# Patient Record
Sex: FEMALE | Race: WHITE | NOT HISPANIC OR LATINO | URBAN - METROPOLITAN AREA
[De-identification: names, ages, dates, MRNs, and addresses within clinical notes are randomized per-mention and may not be internally consistent; named-entity substitution may affect disease eponyms.]

---

## 2019-11-27 ENCOUNTER — EMERGENCY (EMERGENCY)
Facility: HOSPITAL | Age: 79
LOS: 0 days | Discharge: HOME | End: 2019-11-27
Attending: HOSPITALIST
Payer: MEDICARE

## 2019-11-27 VITALS — WEIGHT: 158.95 LBS | HEIGHT: 63 IN

## 2019-11-27 VITALS
SYSTOLIC BLOOD PRESSURE: 140 MMHG | HEART RATE: 65 BPM | OXYGEN SATURATION: 97 % | DIASTOLIC BLOOD PRESSURE: 63 MMHG | RESPIRATION RATE: 16 BRPM | TEMPERATURE: 98 F

## 2019-11-27 DIAGNOSIS — E78.5 HYPERLIPIDEMIA, UNSPECIFIED: ICD-10-CM

## 2019-11-27 DIAGNOSIS — R21 RASH AND OTHER NONSPECIFIC SKIN ERUPTION: ICD-10-CM

## 2019-11-27 DIAGNOSIS — M25.512 PAIN IN LEFT SHOULDER: ICD-10-CM

## 2019-11-27 DIAGNOSIS — M33.20 POLYMYOSITIS, ORGAN INVOLVEMENT UNSPECIFIED: ICD-10-CM

## 2019-11-27 DIAGNOSIS — M79.89 OTHER SPECIFIED SOFT TISSUE DISORDERS: ICD-10-CM

## 2019-11-27 DIAGNOSIS — I10 ESSENTIAL (PRIMARY) HYPERTENSION: ICD-10-CM

## 2019-11-27 DIAGNOSIS — M25.531 PAIN IN RIGHT WRIST: ICD-10-CM

## 2019-11-27 LAB
ALBUMIN SERPL ELPH-MCNC: 4.1 G/DL — SIGNIFICANT CHANGE UP (ref 3.3–5)
ALP SERPL-CCNC: 63 U/L — SIGNIFICANT CHANGE UP (ref 40–120)
ALT FLD-CCNC: 24 U/L — SIGNIFICANT CHANGE UP (ref 12–78)
ANION GAP SERPL CALC-SCNC: 7 MMOL/L — SIGNIFICANT CHANGE UP (ref 5–17)
AST SERPL-CCNC: 25 U/L — SIGNIFICANT CHANGE UP (ref 15–37)
BASOPHILS # BLD AUTO: 0.04 K/UL — SIGNIFICANT CHANGE UP (ref 0–0.2)
BASOPHILS NFR BLD AUTO: 0.3 % — SIGNIFICANT CHANGE UP (ref 0–2)
BILIRUB SERPL-MCNC: 0.6 MG/DL — SIGNIFICANT CHANGE UP (ref 0.2–1.2)
BUN SERPL-MCNC: 18 MG/DL — SIGNIFICANT CHANGE UP (ref 7–23)
CALCIUM SERPL-MCNC: 9.7 MG/DL — SIGNIFICANT CHANGE UP (ref 8.5–10.1)
CHLORIDE SERPL-SCNC: 102 MMOL/L — SIGNIFICANT CHANGE UP (ref 96–108)
CK SERPL-CCNC: 50 U/L — SIGNIFICANT CHANGE UP (ref 26–192)
CO2 SERPL-SCNC: 27 MMOL/L — SIGNIFICANT CHANGE UP (ref 22–31)
CREAT SERPL-MCNC: 0.92 MG/DL — SIGNIFICANT CHANGE UP (ref 0.5–1.3)
EOSINOPHIL # BLD AUTO: 0.45 K/UL — SIGNIFICANT CHANGE UP (ref 0–0.5)
EOSINOPHIL NFR BLD AUTO: 3.5 % — SIGNIFICANT CHANGE UP (ref 0–6)
ERYTHROCYTE [SEDIMENTATION RATE] IN BLOOD: 43 MM/HR — HIGH (ref 0–20)
GLUCOSE SERPL-MCNC: 116 MG/DL — HIGH (ref 70–99)
HCT VFR BLD CALC: 41.6 % — SIGNIFICANT CHANGE UP (ref 34.5–45)
HGB BLD-MCNC: 14.1 G/DL — SIGNIFICANT CHANGE UP (ref 11.5–15.5)
IMM GRANULOCYTES NFR BLD AUTO: 0.3 % — SIGNIFICANT CHANGE UP (ref 0–1.5)
LYMPHOCYTES # BLD AUTO: 1.63 K/UL — SIGNIFICANT CHANGE UP (ref 1–3.3)
LYMPHOCYTES # BLD AUTO: 12.5 % — LOW (ref 13–44)
MCHC RBC-ENTMCNC: 31.5 PG — SIGNIFICANT CHANGE UP (ref 27–34)
MCHC RBC-ENTMCNC: 33.9 GM/DL — SIGNIFICANT CHANGE UP (ref 32–36)
MCV RBC AUTO: 93.1 FL — SIGNIFICANT CHANGE UP (ref 80–100)
MONOCYTES # BLD AUTO: 0.67 K/UL — SIGNIFICANT CHANGE UP (ref 0–0.9)
MONOCYTES NFR BLD AUTO: 5.2 % — SIGNIFICANT CHANGE UP (ref 2–14)
NEUTROPHILS # BLD AUTO: 10.16 K/UL — HIGH (ref 1.8–7.4)
NEUTROPHILS NFR BLD AUTO: 78.2 % — HIGH (ref 43–77)
PLATELET # BLD AUTO: 237 K/UL — SIGNIFICANT CHANGE UP (ref 150–400)
POTASSIUM SERPL-MCNC: 3.8 MMOL/L — SIGNIFICANT CHANGE UP (ref 3.5–5.3)
POTASSIUM SERPL-SCNC: 3.8 MMOL/L — SIGNIFICANT CHANGE UP (ref 3.5–5.3)
PROT SERPL-MCNC: 7.6 GM/DL — SIGNIFICANT CHANGE UP (ref 6–8.3)
RBC # BLD: 4.47 M/UL — SIGNIFICANT CHANGE UP (ref 3.8–5.2)
RBC # FLD: 12.6 % — SIGNIFICANT CHANGE UP (ref 10.3–14.5)
SODIUM SERPL-SCNC: 136 MMOL/L — SIGNIFICANT CHANGE UP (ref 135–145)
WBC # BLD: 12.99 K/UL — HIGH (ref 3.8–10.5)
WBC # FLD AUTO: 12.99 K/UL — HIGH (ref 3.8–10.5)

## 2019-11-27 PROCEDURE — 36415 COLL VENOUS BLD VENIPUNCTURE: CPT

## 2019-11-27 PROCEDURE — 99284 EMERGENCY DEPT VISIT MOD MDM: CPT | Mod: 25

## 2019-11-27 PROCEDURE — 73030 X-RAY EXAM OF SHOULDER: CPT | Mod: 50

## 2019-11-27 PROCEDURE — 73100 X-RAY EXAM OF WRIST: CPT | Mod: 26,50

## 2019-11-27 PROCEDURE — 73030 X-RAY EXAM OF SHOULDER: CPT | Mod: 26,50

## 2019-11-27 PROCEDURE — 80053 COMPREHEN METABOLIC PANEL: CPT

## 2019-11-27 PROCEDURE — 85652 RBC SED RATE AUTOMATED: CPT

## 2019-11-27 PROCEDURE — 86140 C-REACTIVE PROTEIN: CPT

## 2019-11-27 PROCEDURE — 82550 ASSAY OF CK (CPK): CPT

## 2019-11-27 PROCEDURE — 99284 EMERGENCY DEPT VISIT MOD MDM: CPT

## 2019-11-27 PROCEDURE — 73100 X-RAY EXAM OF WRIST: CPT | Mod: 50

## 2019-11-27 PROCEDURE — 85025 COMPLETE CBC W/AUTO DIFF WBC: CPT

## 2019-11-27 RX ORDER — OXYCODONE AND ACETAMINOPHEN 5; 325 MG/1; MG/1
1 TABLET ORAL ONCE
Refills: 0 | Status: DISCONTINUED | OUTPATIENT
Start: 2019-11-27 | End: 2019-11-27

## 2019-11-27 RX ADMIN — Medication 50 MILLIGRAM(S): at 18:56

## 2019-11-27 RX ADMIN — OXYCODONE AND ACETAMINOPHEN 1 TABLET(S): 5; 325 TABLET ORAL at 18:40

## 2019-11-27 NOTE — ED STATDOCS - SKIN, MLM
maculopapular rash involving b/.l UE, chest, back, upper legs, ankles. no involvement of palms. blanching. nonulcerated. no cellulitic appearance.

## 2019-11-27 NOTE — ED ADULT NURSE NOTE - CHIEF COMPLAINT QUOTE
pt sent by MD for rash x1wk that has gotten worse despite steroids. reports b/l hand swelling and pain. states the rash is all over her body. Has been taking Aleve w/o relief. last dose this morning.

## 2019-11-27 NOTE — ED STATDOCS - PATIENT PORTAL LINK FT
You can access the FollowMyHealth Patient Portal offered by [facility] by registering at the following website: [email or web address]. By joining [employer]’s FollowMyHealth portal, you will also be able to view your health information using other applications (apps) compatible with our system.

## 2019-11-27 NOTE — ED STATDOCS - OBJECTIVE STATEMENT
77 y/o male with PMHx of HLD, HTN, carpel tunnel presents to the ED c/o sharp, burning pain and swelling to b/l hands. +joint pain to wrists, shoulders. Pt also with generalized rash first noticed to arms and later radiating in. Pt initially with itch, which has resolved. Denies fever. Taking PO steroid with no relief. Took Zyrtec with relief of itch. Taking Aleve with no relief. No recent travel. No new cosmetics or exposures. PCP/Dr. Araiza but is being taken over by Dr. Bennett.

## 2019-11-27 NOTE — ED STATDOCS - NSFOLLOWUPINSTRUCTIONS_ED_ALL_ED_FT
Polymyositis  Polymyositis is a disease that causes inflammation and weakness of the muscles. It can also affect skin tissues. Polymyositis is also known as idiopathic inflammatory myopathy. It is often associated with diseases in which the body mistakenly attacks its own cells and tissues (autoimmune diseases).  What are the causes?  The cause of polymyositis is not known.  What increases the risk?  The following factors may make you more likely to develop this condition:  Gender. Polymyositis is more common in women.Age. The disease is more common in adults.What are the signs or symptoms?  Symptoms of this condition include:  Weakness in your neck, shoulder, upper arm, hip, or thigh.Having difficulty:  Rising from a seated position.Climbing stairs.Lifting objects.Reaching overhead.Swallowing (dysphagia).Sore muscles.Fatigue.Fever.Hard bumps under your skin.Unexplained weight loss.How is this diagnosed?  This condition may be diagnosed based on medical history and a physical exam. Various tests may also be done, including:  Blood tests.MRI.EMG (electromyography). This is a test to check the electrical activity of your muscles.Biopsy. This is a test in which a sample of muscle tissue is taken and checked under a microscope.How is this treated?  There is no cure for polymyositis, but treatment can improve muscle strength and function. The earlier the treatment is started, the more effective it is. Treatment may include:  Corticosteroid medicines to help control inflammation.Immunoglobulin medicines to introduce healthy antibodies from blood donors.Immunosuppressive medicines to control the activity of the immune system.Physical therapy to strengthen muscles.Speech and language therapy to improve your ability to talk and eat.Follow these instructions at home:     Safety        Stay active. An exercise routine can help you build and maintain muscle strength. Talk with your health care provider or your physical therapist before you start any exercise program.If necessary, take steps to prevent falls by:  Installing grab bars for your tub, shower, and toilet.Installing a pole that goes from floor to ceiling. This helps you when going from a seated to a standing position.Protect your skin from the sun. Wear sunscreen or protective clothing when you are outside.General instructions     Work closely with your health care team, including physical and speech therapists, if needed.Take over-the-counter and prescription medicines only as told by your health care provider.Avoid alcohol.Rest when you are tired.Do not use any products that contain nicotine or tobacco, such as cigarettes and e-cigarettes. If you need help quitting, ask your health care provider.Keep all follow-up visits as told by your health care provider. This is important.Contact a health care provider if you:  Develop new or worsening muscle weakness.Get help right away if you have:  Trouble swallowing or speaking.Shortness of breath.Summary  Polymyositis is a disease that causes inflammation and weakness of your muscles. It can also affect your skin tissues.It is often associated with diseases in which the body attacks its own cells and tissues (autoimmune diseases).The cause of polymyositis is not known.There is no cure for polymyositis, but treatment can improve muscle strength and function. The earlier treatment is started, the more effective it is.This information is not intended to replace advice given to you by your health care provider. Make sure you discuss any questions you have with your health care provider.

## 2019-11-27 NOTE — ED ADULT TRIAGE NOTE - CHIEF COMPLAINT QUOTE
pt sent by MD for rash x1wk that has gotten worse despite steroids. reports b/l hand swelling and pain. states the rash is all over her body pt sent by MD for rash x1wk that has gotten worse despite steroids. reports b/l hand swelling and pain. states the rash is all over her body. Has been taking Aleve w/o relief. last dose this morning.

## 2019-11-27 NOTE — ED STATDOCS - CARE PROVIDER_API CALL
Palmer Cisse (DO)  Internal Medicine; Rheumatology  [address] [place] [address]  Phone: [phone]  Fax: [phone]  Follow Up Time:

## 2019-11-27 NOTE — ED STATDOCS - PROGRESS NOTE DETAILS
77 y/o F with PMH of HLD, HTN, carpal tunnel, sarcoidsis presents with bilateral UE pain and diffuse rash. Pt states rash is present over bilateral upper extremities, torso and lower extremities. states rash was initially itchy, which improved with zyrtec. No change in pain with aleve at home. Was seen by PCP last week, provided medrol dosepak. Reevaluated today and was advised to go to ED. Reports difficulty with ROM in bilateral shoulders as well. Denies tick bite, spider bite, fever, chills, recent outdoor activity, recent antibiotic use, new detergents, new soaps, new lotions, numbness/tingling in extremities. PE: Well appearing. Cardiac: s1s2, RRR. Lungs: CTAB. Abdomen: NBS x4, soft, nontender. Skin: diffuse maculopapular rash to all extremities and torso. No involvement past wrist/ankles. +blanching. MSK: pt has ring on 4th digit left upper extremity. +edema left upper extremities distal to wrist. Full ROM in bilateral hands, fingers, wrists. limited bilateral shoulder flexion. Neuro: Sensation intact to light touch in all extremities. A/p: diffuse maculopapular rash. 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 Plan for labs, analgesia, prednisone, likely d/c with rheumatology & dermatology follow up. - Raudel Lucas PA-C Patient recommended to have ring on 4th digit left hand removed. States she is unable to remove her ring on her own. Refused to have ring cut. Patient made aware of possibility of cutting off neurovascular supply to finger which may result in death of tissue & loss of finger. patient states she would prefer to attempt to remove ring at home and not have ring cut in ED. - Raudel Lucas PA-C Will manage with prednisone, advised patient to pursue rheumatology follow up. Plan for d/c. - KHALIDA ArcherC

## 2019-11-27 NOTE — ED STATDOCS - NS_ ATTENDINGSCRIBEDETAILS _ED_A_ED_FT
Jing Figueroa MD: The history, relevant review of systems, past medical and surgical history, medical decision making, and physical examination was documented by the scribe in my presence and I attest to the accuracy of the documentation.

## 2019-11-28 LAB — CRP SERPL-MCNC: 7.75 MG/DL — HIGH (ref 0–0.4)

## 2019-11-28 NOTE — ED POST DISCHARGE NOTE - RESULT SUMMARY
Elevated CRP, spoke to pt and advised of level, recommend pt see rheum in follow up as advised in ED. Pt agrees with plan of  care. signed Valerie Silva PA-C

## 2025-03-26 ENCOUNTER — RX ONLY (RX ONLY)
Age: 85
End: 2025-03-26

## 2025-03-26 ENCOUNTER — OFFICE (OUTPATIENT)
Dept: URBAN - METROPOLITAN AREA CLINIC 102 | Facility: CLINIC | Age: 85
Setting detail: OPHTHALMOLOGY
End: 2025-03-26
Payer: COMMERCIAL

## 2025-03-26 DIAGNOSIS — H02.831: ICD-10-CM

## 2025-03-26 DIAGNOSIS — H16.223: ICD-10-CM

## 2025-03-26 DIAGNOSIS — H02.834: ICD-10-CM

## 2025-03-26 DIAGNOSIS — H40.023: ICD-10-CM

## 2025-03-26 PROBLEM — H01.002 BLEPHARITIS; RIGHT UPPER LID, RIGHT LOWER LID, LEFT UPPER LID, LEFT LOWER LID: Status: ACTIVE | Noted: 2025-03-26

## 2025-03-26 PROBLEM — H01.004 BLEPHARITIS; RIGHT UPPER LID, RIGHT LOWER LID, LEFT UPPER LID, LEFT LOWER LID: Status: ACTIVE | Noted: 2025-03-26

## 2025-03-26 PROBLEM — H25.13 CATARACT NUCLEAR SCLEROSIS AGE RELATED; BOTH EYES: Status: ACTIVE | Noted: 2025-03-26

## 2025-03-26 PROBLEM — H01.001 BLEPHARITIS; RIGHT UPPER LID, RIGHT LOWER LID, LEFT UPPER LID, LEFT LOWER LID: Status: ACTIVE | Noted: 2025-03-26

## 2025-03-26 PROBLEM — H52.7 REFRACTIVE ERROR: Status: ACTIVE | Noted: 2025-03-26

## 2025-03-26 PROBLEM — H01.005 BLEPHARITIS; RIGHT UPPER LID, RIGHT LOWER LID, LEFT UPPER LID, LEFT LOWER LID: Status: ACTIVE | Noted: 2025-03-26

## 2025-03-26 PROCEDURE — 92020 GONIOSCOPY: CPT | Performed by: OPHTHALMOLOGY

## 2025-03-26 PROCEDURE — 92083 EXTENDED VISUAL FIELD XM: CPT | Performed by: OPHTHALMOLOGY

## 2025-03-26 PROCEDURE — 92004 COMPRE OPH EXAM NEW PT 1/>: CPT | Performed by: OPHTHALMOLOGY

## 2025-03-26 PROCEDURE — 92133 CPTRZD OPH DX IMG PST SGM ON: CPT | Performed by: OPHTHALMOLOGY

## 2025-03-26 ASSESSMENT — TONOMETRY
OD_IOP_MMHG: 15
OS_IOP_MMHG: 15

## 2025-03-26 ASSESSMENT — KERATOMETRY
OS_K2POWER_DIOPTERS: 44.25
OS_AXISANGLE_DEGREES: 161
OD_K2POWER_DIOPTERS: 43.25
OD_AXISANGLE_DEGREES: 11
OS_K1POWER_DIOPTERS: 42.25
OD_K1POWER_DIOPTERS: 42.00

## 2025-03-26 ASSESSMENT — REFRACTION_CURRENTRX
OD_CYLINDER: -1.25
OD_AXIS: 100
OS_AXIS: 89
OS_OVR_VA: 20/
OD_AXIS: 103
OD_OVR_VA: 20/
OD_SPHERE: +5.00
OS_CYLINDER: -2.00
OD_VPRISM_DIRECTION: SV
OS_VPRISM_DIRECTION: SV
OD_VPRISM_DIRECTION: SV
OS_SPHERE: +3.75
OS_AXIS: 81
OS_OVR_VA: 20/
OD_SPHERE: +3.25
OS_SPHERE: +6.00
OS_CYLINDER: -2.25
OS_VPRISM_DIRECTION: SV
OD_CYLINDER: -1.00
OD_OVR_VA: 20/

## 2025-03-26 ASSESSMENT — VISUAL ACUITY
OD_BCVA: 20/50
OS_BCVA: 20/50

## 2025-03-26 ASSESSMENT — REFRACTION_AUTOREFRACTION
OD_AXIS: 99
OS_CYLINDER: -2.50
OD_SPHERE: +3.50
OS_AXIS: 82
OS_SPHERE: +3.75
OD_CYLINDER: -2.25

## 2025-03-26 ASSESSMENT — REFRACTION_MANIFEST
OD_AXIS: 99
OS_VA1: 20/30-
OS_AXIS: 89
OS_VA1: 20/NI
OD_CYLINDER: -2.25
OD_SPHERE: +3.50
OS_AXIS: 82
OS_SPHERE: +3.75
OD_AXIS: 103
OD_VA1: 20/NI
OD_SPHERE: +3.25
OS_SPHERE: +3.75
OD_VA1: 20/30+
OS_CYLINDER: -2.25
OD_CYLINDER: -1.25
OS_CYLINDER: -2.50

## 2025-03-26 ASSESSMENT — DRY EYES - PHYSICIAN NOTES
OS_GENERALCOMMENTS: +1
OD_GENERALCOMMENTS: +1

## 2025-03-26 ASSESSMENT — LID POSITION - DERMATOCHALASIS
OS_DERMATOCHALASIS: LUL 3+
OD_DERMATOCHALASIS: RUL 3+

## 2025-03-26 ASSESSMENT — CONFRONTATIONAL VISUAL FIELD TEST (CVF)
OS_FINDINGS: FULL
OD_FINDINGS: FULL

## 2025-03-26 ASSESSMENT — LID EXAM ASSESSMENTS
OD_BLEPHARITIS: RLL RUL 3+
OS_BLEPHARITIS: LLL LUL 3+

## 2025-04-15 ENCOUNTER — OFFICE (OUTPATIENT)
Dept: URBAN - METROPOLITAN AREA CLINIC 102 | Facility: CLINIC | Age: 85
Setting detail: OPHTHALMOLOGY
End: 2025-04-15
Payer: COMMERCIAL

## 2025-04-15 DIAGNOSIS — H16.223: ICD-10-CM

## 2025-04-15 DIAGNOSIS — H01.001: ICD-10-CM

## 2025-04-15 DIAGNOSIS — H02.834: ICD-10-CM

## 2025-04-15 DIAGNOSIS — H02.831: ICD-10-CM

## 2025-04-15 DIAGNOSIS — L82.1: ICD-10-CM

## 2025-04-15 PROBLEM — H53.40 VISUAL FIELD DEFECT ; BOTH EYES: Status: ACTIVE | Noted: 2025-04-15

## 2025-04-15 PROCEDURE — 92285 EXTERNAL OCULAR PHOTOGRAPHY: CPT | Performed by: OPHTHALMOLOGY

## 2025-04-15 PROCEDURE — 92012 INTRM OPH EXAM EST PATIENT: CPT | Performed by: OPHTHALMOLOGY

## 2025-04-15 ASSESSMENT — REFRACTION_CURRENTRX
OS_OVR_VA: 20/
OD_AXIS: 103
OD_CYLINDER: -1.25
OD_VPRISM_DIRECTION: SV
OS_OVR_VA: 20/
OS_VPRISM_DIRECTION: SV
OS_SPHERE: +6.00
OD_SPHERE: +5.00
OS_AXIS: 81
OD_OVR_VA: 20/
OS_CYLINDER: -2.00
OS_VPRISM_DIRECTION: SV
OD_OVR_VA: 20/
OS_SPHERE: +3.75
OD_AXIS: 100
OS_CYLINDER: -2.25
OD_VPRISM_DIRECTION: SV
OS_AXIS: 89
OD_SPHERE: +3.25
OD_CYLINDER: -1.00

## 2025-04-15 ASSESSMENT — REFRACTION_AUTOREFRACTION
OD_SPHERE: +3.50
OS_CYLINDER: -2.50
OS_AXIS: 82
OD_CYLINDER: -2.25
OS_SPHERE: +3.75
OD_AXIS: 99

## 2025-04-15 ASSESSMENT — DRY EYES - PHYSICIAN NOTES
OD_GENERALCOMMENTS: +1
OS_GENERALCOMMENTS: +1

## 2025-04-15 ASSESSMENT — VISUAL ACUITY
OD_BCVA: 20/40-1
OS_BCVA: 20/50

## 2025-04-15 ASSESSMENT — KERATOMETRY
OD_K2POWER_DIOPTERS: 43.25
OS_K2POWER_DIOPTERS: 44.25
OS_K1POWER_DIOPTERS: 42.25
OD_AXISANGLE_DEGREES: 11
OD_K1POWER_DIOPTERS: 42.00
OS_AXISANGLE_DEGREES: 161

## 2025-04-15 ASSESSMENT — LID EXAM ASSESSMENTS
OD_BLEPHARITIS: RLL RUL 3+
OS_BLEPHARITIS: LLL LUL 3+

## 2025-04-15 ASSESSMENT — CONFRONTATIONAL VISUAL FIELD TEST (CVF)
OS_FINDINGS: FULL
OD_FINDINGS: FULL

## 2025-04-15 ASSESSMENT — LID POSITION - DERMATOCHALASIS
OD_DERMATOCHALASIS: RUL 3+
OS_DERMATOCHALASIS: LUL 3+

## 2025-06-11 ENCOUNTER — OFFICE (OUTPATIENT)
Dept: URBAN - METROPOLITAN AREA CLINIC 102 | Facility: CLINIC | Age: 85
Setting detail: OPHTHALMOLOGY
End: 2025-06-11
Payer: COMMERCIAL

## 2025-06-11 DIAGNOSIS — H02.831: ICD-10-CM

## 2025-06-11 DIAGNOSIS — Z79.899: ICD-10-CM

## 2025-06-11 DIAGNOSIS — H25.13: ICD-10-CM

## 2025-06-11 DIAGNOSIS — H40.023: ICD-10-CM

## 2025-06-11 DIAGNOSIS — H16.223: ICD-10-CM

## 2025-06-11 PROCEDURE — 76514 ECHO EXAM OF EYE THICKNESS: CPT | Performed by: OPHTHALMOLOGY

## 2025-06-11 PROCEDURE — 99213 OFFICE O/P EST LOW 20 MIN: CPT | Performed by: OPHTHALMOLOGY

## 2025-06-11 PROCEDURE — 92250 FUNDUS PHOTOGRAPHY W/I&R: CPT | Performed by: OPHTHALMOLOGY

## 2025-06-11 PROCEDURE — 92083 EXTENDED VISUAL FIELD XM: CPT | Performed by: OPHTHALMOLOGY

## 2025-06-11 ASSESSMENT — LID POSITION - DERMATOCHALASIS
OS_DERMATOCHALASIS: LUL 3+
OD_DERMATOCHALASIS: RUL 3+

## 2025-06-11 ASSESSMENT — KERATOMETRY
OD_K2POWER_DIOPTERS: 43.25
OD_AXISANGLE_DEGREES: 012
OS_K1POWER_DIOPTERS: 42.25
OS_AXISANGLE_DEGREES: 162
METHOD_AUTO_MANUAL: AUTO
OD_K1POWER_DIOPTERS: 42.00
OS_K2POWER_DIOPTERS: 44.25

## 2025-06-11 ASSESSMENT — CONFRONTATIONAL VISUAL FIELD TEST (CVF)
OS_FINDINGS: FULL
OD_FINDINGS: FULL

## 2025-06-11 ASSESSMENT — REFRACTION_CURRENTRX
OS_AXIS: 81
OD_OVR_VA: 20/
OS_VPRISM_DIRECTION: SV
OD_AXIS: 103
OS_CYLINDER: -2.00
OS_OVR_VA: 20/
OD_CYLINDER: -1.00
OD_CYLINDER: -1.25
OS_CYLINDER: -2.25
OS_VPRISM_DIRECTION: SV
OS_SPHERE: +3.75
OS_OVR_VA: 20/
OD_OVR_VA: 20/
OS_SPHERE: +6.00
OD_SPHERE: +3.25
OD_AXIS: 100
OD_SPHERE: +5.00
OS_AXIS: 89
OD_VPRISM_DIRECTION: SV
OD_VPRISM_DIRECTION: SV

## 2025-06-11 ASSESSMENT — LID EXAM ASSESSMENTS
OS_BLEPHARITIS: LLL LUL 3+
OD_BLEPHARITIS: RLL RUL 3+

## 2025-06-11 ASSESSMENT — REFRACTION_AUTOREFRACTION
OD_SPHERE: +3.50
OS_SPHERE: +3.50
OD_CYLINDER: -3.00
OD_AXIS: 089
OS_AXIS: 077
OS_CYLINDER: -2.50

## 2025-06-11 ASSESSMENT — VISUAL ACUITY
OD_BCVA: 20/40-1
OS_BCVA: 20/50

## 2025-06-11 ASSESSMENT — DRY EYES - PHYSICIAN NOTES
OD_GENERALCOMMENTS: +1
OS_GENERALCOMMENTS: +1

## 2025-06-11 ASSESSMENT — PACHYMETRY
OD_CT_CORRECTION: 1
OS_CT_CORRECTION: 1
OD_CT_UM: 535
OS_CT_UM: 531

## 2025-06-11 ASSESSMENT — TONOMETRY
OS_IOP_MMHG: 16
OD_IOP_MMHG: 15

## 2025-07-01 ENCOUNTER — OFFICE (OUTPATIENT)
Dept: URBAN - METROPOLITAN AREA CLINIC 102 | Facility: CLINIC | Age: 85
Setting detail: OPHTHALMOLOGY
End: 2025-07-01
Payer: COMMERCIAL

## 2025-07-01 DIAGNOSIS — H01.002: ICD-10-CM

## 2025-07-01 DIAGNOSIS — H02.831: ICD-10-CM

## 2025-07-01 DIAGNOSIS — H16.223: ICD-10-CM

## 2025-07-01 DIAGNOSIS — H01.005: ICD-10-CM

## 2025-07-01 DIAGNOSIS — H02.834: ICD-10-CM

## 2025-07-01 DIAGNOSIS — H01.001: ICD-10-CM

## 2025-07-01 DIAGNOSIS — H01.004: ICD-10-CM

## 2025-07-01 DIAGNOSIS — H53.40: ICD-10-CM

## 2025-07-01 DIAGNOSIS — L82.1: ICD-10-CM

## 2025-07-01 PROBLEM — H43.393 VITREOUS FLOATERS; BOTH EYES: Status: ACTIVE | Noted: 2025-06-11

## 2025-07-01 PROBLEM — Z79.899 PLAQUENIL/MEDICATION: Status: ACTIVE | Noted: 2025-06-11

## 2025-07-01 PROCEDURE — 92081 LIMITED VISUAL FIELD XM: CPT | Performed by: OPHTHALMOLOGY

## 2025-07-01 PROCEDURE — 92012 INTRM OPH EXAM EST PATIENT: CPT | Performed by: OPHTHALMOLOGY

## 2025-07-01 PROCEDURE — 92285 EXTERNAL OCULAR PHOTOGRAPHY: CPT | Performed by: OPHTHALMOLOGY

## 2025-07-01 ASSESSMENT — KERATOMETRY
OD_AXISANGLE_DEGREES: 012
OS_K2POWER_DIOPTERS: 44.25
OS_AXISANGLE_DEGREES: 162
OS_K1POWER_DIOPTERS: 42.25
METHOD_AUTO_MANUAL: AUTO
OD_K1POWER_DIOPTERS: 42.00
OD_K2POWER_DIOPTERS: 43.25

## 2025-07-01 ASSESSMENT — DRY EYES - PHYSICIAN NOTES
OD_GENERALCOMMENTS: +1
OS_GENERALCOMMENTS: +1

## 2025-07-01 ASSESSMENT — VISUAL ACUITY
OD_BCVA: 20/40-1
OS_BCVA: 20/50

## 2025-07-01 ASSESSMENT — CONFRONTATIONAL VISUAL FIELD TEST (CVF)
OS_FINDINGS: FULL
OD_FINDINGS: FULL

## 2025-07-01 ASSESSMENT — LID EXAM ASSESSMENTS
OD_BLEPHARITIS: RLL RUL 3+
OS_BLEPHARITIS: LLL LUL 3+

## 2025-07-01 ASSESSMENT — REFRACTION_AUTOREFRACTION
OS_SPHERE: +3.50
OD_CYLINDER: -3.00
OS_AXIS: 077
OD_SPHERE: +3.50
OD_AXIS: 089
OS_CYLINDER: -2.50

## 2025-07-01 ASSESSMENT — LID POSITION - DERMATOCHALASIS
OS_DERMATOCHALASIS: LUL
OD_DERMATOCHALASIS: RUL